# Patient Record
Sex: MALE | Race: WHITE | Employment: FULL TIME | URBAN - METROPOLITAN AREA
[De-identification: names, ages, dates, MRNs, and addresses within clinical notes are randomized per-mention and may not be internally consistent; named-entity substitution may affect disease eponyms.]

---

## 2021-09-21 ENCOUNTER — OFFICE VISIT (OUTPATIENT)
Dept: ORTHOPEDIC SURGERY | Age: 36
End: 2021-09-21
Payer: OTHER GOVERNMENT

## 2021-09-21 VITALS — HEART RATE: 88 BPM | WEIGHT: 271 LBS | TEMPERATURE: 98.7 F | RESPIRATION RATE: 14 BRPM | OXYGEN SATURATION: 98 %

## 2021-09-21 DIAGNOSIS — M25.531 RIGHT WRIST PAIN: ICD-10-CM

## 2021-09-21 DIAGNOSIS — M25.531 RIGHT WRIST PAIN: Primary | ICD-10-CM

## 2021-09-21 DIAGNOSIS — M25.331 SCAPHOLUNATE DISSOCIATION OF RIGHT WRIST: ICD-10-CM

## 2021-09-21 DIAGNOSIS — S69.81XA TFCC (TRIANGULAR FIBROCARTILAGE COMPLEX) INJURY, RIGHT, INITIAL ENCOUNTER: ICD-10-CM

## 2021-09-21 DIAGNOSIS — Z91.89 AT RISK FOR ANXIETY: ICD-10-CM

## 2021-09-21 PROCEDURE — 99203 OFFICE O/P NEW LOW 30 MIN: CPT | Performed by: PHYSICIAN ASSISTANT

## 2021-09-21 PROCEDURE — 73110 X-RAY EXAM OF WRIST: CPT | Performed by: PHYSICIAN ASSISTANT

## 2021-09-21 RX ORDER — DIAZEPAM 5 MG/1
5 TABLET ORAL ONCE
Qty: 1 TABLET | Refills: 0 | Status: SHIPPED | OUTPATIENT
Start: 2021-09-21 | End: 2021-09-21

## 2021-09-21 NOTE — PROGRESS NOTES
Patient: Thony Arnold                MRN: [id number]       SSN: [id number]  YOB: 1985        AGE: 39 y.o. SEX: male          PCP: David, Not On File, MD  09/21/21    Chief Complaint   Patient presents with    Wrist Pain     right wrist pain       HISTORY:  Thony Arnold is a 39 y.o. male who presents to the office with complaint of right wrist pain. He had an injury that occurred back in 2017 while a  with the [facility] states our services. No identifiable fracture could be found on x-ray post incident. Has been treated through the [employer] and recently had an MRI of the right wrist indicating a scapholunate injury. Patient was provided through the [facility] a custom thumb spica splint constructed by occupational therapy. He is wearing the splint near 24/7 and when the splint is removed he has severe pain at the base of the thumb side of the wrist.  He feels like the wrist is slipping out of place when he attempts to flex. 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  He had a previous test which was negative but had a secondary symptom possible allergic reaction and was placed in a work hold position by his employer until 2 - tests were returned. 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      Pain Assessment  9/21/2021   Location of Pain Wrist   Location Modifiers Right   Severity of Pain 7   Quality of Pain Sharp; Other (Comment)   Quality of Pain Comment siezing, cramping, \"random loss of feeling\"   Duration of Pain Persistent   Frequency of Pain Intermittent   Limiting Behavior Yes   Result of Injury Yes   Work-Related Injury Yes           No results found for: HBA1C, [id number], [id number]  Weight Metrics 9/21/2021   Weight 271 lb            Problem List Items Addressed This Visit     None      Visit Diagnoses     Right wrist pain    -  Primary    Relevant Orders    AMB POC XRAY, WRIST; COMPLETE, 3+ VIE (Completed)          PAST MEDICAL HISTORY: No past medical history on file. PAST SURGICAL HISTORY: No past surgical history on file. ALLERGIES: Not on File     CURRENT MEDICATIONS:  A list of medications prior to the time of admission include:  Prior to Admission medications    Not on File       FAMILY HISTORY: No family history on file. SOCIAL HISTORY:   Social History     Socioeconomic History    Marital status: UNKNOWN     Spouse name: Not on file    Number of children: Not on file    Years of education: Not on file    Highest education level: Not on file     Social Determinants of Health     Financial Resource Strain:     Difficulty of Paying Living Expenses:    Food Insecurity:     Worried About Running Out of Food in the Last Year:     [address] in the Last Year:    Transportation Needs:     Lack of Transportation (Medical):  Lack of Transportation (Non-Medical):    Physical Activity:     Days of Exercise per Week:     Minutes of Exercise per Session:    Stress:     Feeling of Stress :    Social Connections:     Frequency of Communication with Friends and Family:     Frequency of Social Gatherings with Friends and Family:     Attends Protestant Services:     Active Member of Clubs or Organizations:     Attends Club or Organization Meetings:     Marital Status:        ROS:No CP, No SOB, No fever/chills nor night sweats. No headaches, vision abnormalities to include double and oral loss of vision. No hearing abnormalities. Musculoskeletal pain per HPI. Pain is exacerbated positionally. Pt denies h/o spinal surgery, injections, or PT/chiropractor. Self treated with less than adequate relief on oral antiinflammatories. . Pt denies change in bowel or bladder habits. Pt denies fever, weight loss, or skin changes.         PHYSICAL EXAM:    Visit Vitals  Pulse 88   Temp 98.7 °F (37.1 °C) (Temporal)   Resp 14   Wt 271 lb (122.9 kg)   SpO2 98%       Constitutional: Appears well-developed and well-nourished. No distress. Sitting comfortably in the exam room, interacting with conversation with pleasant affect. Gait is steady and patient exhibits no evidence of ataxia. Patient is able to ambulate without difficulty. No focal neurological deficit noted. No facial droop, slurred speech, or evidence of altered mentation noted on exam.   Skin: Skin over the head, neck, bilateral limbs, and trunk is warm and dry. No rash or erythema noted. Cranial Nerves II-XII grossly intact  HENT: NC/AT. Normal symmetry, bulk and tone of facial and neck musculature. Trachea midline. No discernible thyromegaly or masses. No involuntary movements. Lymphatic: No preauricular, submandibuar, anterior or posterior cervical lymphadenopathy. Psychiatric: The patient is awake, alert, and oriented to person, place and time. Behavior is normal. Thought content normal.   Cardiovascular: No clubbing, cyanosis. No edema bilateral lower extremities. Pulmonary: No tripoding nor accessory muscle recruitment. Breathing normally, no distress, no audible wheezing. Distal cap refill intact at 2/2 Kleber UE / LE. Neuro intact Kleber UE/LE to noxious stimuli        Ortho Specific exam:    Right wrist reveals pain at the base of the thumb with a positive crank and a positive grind sign. Passive flexion limited today at only 20 degrees with pain throughout    Passive extension 10 degrees with pain throughout primarily on the radial side at the radiocarpal joint. Distal sensation intact fully right upper extremity. Patient has full flexion-extension of all digits of the right hand to include the thumb. Eversion barely 1 degree passively with pain and inversion trace with pain throughout. Pronation supination decreased by 75% secondary to pain reproduced over the scapholunate joint space.       X-ray: Phuong powell [address] 9/21/2021 space 3 view of the right wrist reflects what appears to be a healed distal radius fracture with subsequent radiocarpal osteoarthritis. There is widening of the scapholunate joint space. No soft tissue ossifications noted. MRI historical 6/18/2021: Degenerative and at least partial tear of the scapholunate ligament. Increased signal in the central TFCC region and trace distal radial ulnar joint effusion representing degenerative and possible central perforation. Degenerative change also noted in the radial scaphoid joint space. IMPRESSION:      ICD-10-CM ICD-9-CM    1. Right wrist pain  M25.531 719.43 AMB POC XRAY, WRIST; COMPLETE, 3+ VIE        PLAN: At this point with questionable results following the MRI date 6/18/2021 and confirmed with scapholunate widening I am ordering a MRI with contrast to fully assess the TFCC and ligamentous structures of the right wrist.  Patient will continue on light duty has no provided today. He will follow once the MRI is available with Dr. Bogdan Reagan. We will continue his custom thumb spica splint. X-rays reviewed copies provided all of his questions answered to his satisfaction. Additionally today we discussed the diagnosis of obesity and the importance of weight management for both cardiovascular health. The patient was recommended to decrease carbohydrate and sugar intake. Patient recommended a formal dietary consult which they will consider and return a call to our office. In light of the patient's osteoarthritic findings I am making a recommendation for aerobic exercise to include but not limited to stationary bicycle, elliptical, therapeutic walking with good shoes and or swimming. Patient should avoid any running or jumping. If using the treadmill then recommendation for no elevation and no running or jogging. Walking is improved. No Narcotic indicated today.  Patient given pain medication for short term acute pain relief. Goal is to treat patient according to above plan and to ultimately have patient off all pain medications once appropriate. If chronic pain management is required beyond what is expected for current orthopedic problem, will refer patient to pain management.  was reviewed and will be reviewed with every medication refill request.         Patient provided a reminder for a \"due or due soon\" health maintenance. I have asked the patient to schedule an appointment with their primary care provider for follow-up on general health maintenance concerns. Today all the patient's questions were answered to their satisfaction. Copies of x-rays reviewed if obtained this visit, and provided to patient. Dictation disclaimer:  Please note that this dictation was completed with Narrable, the computer voice recognition software. Quite often unanticipated grammatical, syntax, homophones, and other interpretive errors are inadvertently transcribed by the computer software. Please disregard these errors. Please excuse any errors that have escaped final proofreading. Venita VERAS, APC, MPAS, PA-C  [facility]

## 2021-09-21 NOTE — LETTER
9/21/2021 9:23 AM    Mr. Melisa Runner  [address]    To whom it may concern:    Patient seen and examined in the orthopedic office today. Patient cleared to return to light duty only 9/21/2021 with the following restrictions. 1.  No ladder or scaffolding climbing  2. No crawling  3. Push, pull, lift, carry no more than 1 pound right upper extremity. 4.  Patient must wear thumb spica custom splint right upper extremity 24/7 may remove for hygiene purposes.                 Sincerely,      Maria Guadalupe Gonzalez PA-C

## 2021-09-22 ENCOUNTER — TELEPHONE (OUTPATIENT)
Dept: ORTHOPEDIC SURGERY | Age: 36
End: 2021-09-22

## 2021-09-22 DIAGNOSIS — M25.539 PAIN IN WRIST, UNSPECIFIED LATERALITY: ICD-10-CM

## 2021-09-22 DIAGNOSIS — M25.331 SCAPHOLUNATE DISSOCIATION OF RIGHT WRIST: ICD-10-CM

## 2021-09-22 DIAGNOSIS — M25.539 PAIN IN WRIST, UNSPECIFIED LATERALITY: Primary | ICD-10-CM

## 2021-09-22 NOTE — TELEPHONE ENCOUNTER
I received email from [address] [facility] asking  \" Can you get someone to add pt Arthrogram order in\"    \"He also wants to know if his Ativan was put in and to call him to let him know\"    Farhana Mac saw patient and ordered MRI right wrist with contrast.     Please let me know when completed so I can advise TRIPP.

## 2021-10-08 ENCOUNTER — HOSPITAL ENCOUNTER (OUTPATIENT)
Dept: GENERAL RADIOLOGY | Age: 36
Discharge: HOME | End: 2021-10-08
Payer: OTHER GOVERNMENT

## 2021-10-08 ENCOUNTER — HOSPITAL ENCOUNTER (OUTPATIENT)
Dept: MRI IMAGING | Age: 36
Discharge: HOME | End: 2021-10-08
Payer: OTHER GOVERNMENT

## 2021-10-08 PROCEDURE — 74011250636 HC RX REV CODE- 250/636: Performed by: PHYSICIAN ASSISTANT

## 2021-10-08 PROCEDURE — 74011000636 HC RX REV CODE- 636: Performed by: PHYSICIAN ASSISTANT

## 2021-10-08 PROCEDURE — 74011000250 HC RX REV CODE- 250: Performed by: PHYSICIAN ASSISTANT

## 2021-10-08 PROCEDURE — 73222 MRI JOINT UPR EXTREM W/DYE: CPT

## 2021-10-08 PROCEDURE — A9576 INJ PROHANCE MULTIPACK: HCPCS | Performed by: PHYSICIAN ASSISTANT

## 2021-10-08 PROCEDURE — 77002 NEEDLE LOCALIZATION BY XRAY: CPT

## 2021-10-08 RX ORDER — LIDOCAINE HYDROCHLORIDE 10 MG/ML
1-5 INJECTION INFILTRATION; PERINEURAL ONCE
Status: DISCONTINUED | OUTPATIENT
Start: 2021-10-08 | End: 2021-10-08

## 2021-10-08 RX ORDER — LIDOCAINE HYDROCHLORIDE 10 MG/ML
4 INJECTION INFILTRATION; PERINEURAL ONCE
Status: DISCONTINUED | OUTPATIENT
Start: 2021-10-08 | End: 2021-10-08 | Stop reason: CLARIF

## 2021-10-08 RX ORDER — LIDOCAINE HYDROCHLORIDE 10 MG/ML
5 INJECTION, SOLUTION EPIDURAL; INFILTRATION; INTRACAUDAL; PERINEURAL ONCE
Status: COMPLETED | OUTPATIENT
Start: 2021-10-08 | End: 2021-10-08

## 2021-10-08 RX ADMIN — GADOTERIDOL 0.1 ML: 279.3 INJECTION, SOLUTION INTRAVENOUS at 11:00

## 2021-10-08 RX ADMIN — IOPAMIDOL 1 ML: 612 INJECTION, SOLUTION INTRATHECAL at 11:00

## 2021-10-08 RX ADMIN — LIDOCAINE HYDROCHLORIDE ANHYDROUS 2 ML: 10 INJECTION, SOLUTION INFILTRATION at 11:00

## 2021-11-03 ENCOUNTER — OFFICE VISIT (OUTPATIENT)
Dept: ORTHOPEDIC SURGERY | Age: 36
End: 2021-11-03
Payer: OTHER GOVERNMENT

## 2021-11-03 VITALS
BODY MASS INDEX: 39.97 KG/M2 | RESPIRATION RATE: 16 BRPM | OXYGEN SATURATION: 99 % | TEMPERATURE: 97.2 F | HEIGHT: 70 IN | HEART RATE: 85 BPM | WEIGHT: 279.2 LBS

## 2021-11-03 DIAGNOSIS — S69.82XS TFCC (TRIANGULAR FIBROCARTILAGE COMPLEX) INJURY, LEFT, SEQUELA: ICD-10-CM

## 2021-11-03 DIAGNOSIS — M25.331 CARPAL INSTABILITY OF RIGHT WRIST WITH DORSAL INTERCALATED SEGMENT INSTABILITY: Primary | ICD-10-CM

## 2021-11-03 PROCEDURE — 99214 OFFICE O/P EST MOD 30 MIN: CPT | Performed by: ORTHOPAEDIC SURGERY

## 2021-11-03 PROCEDURE — 20605 DRAIN/INJ JOINT/BURSA W/O US: CPT | Performed by: ORTHOPAEDIC SURGERY

## 2021-11-03 RX ORDER — AMLODIPINE BESYLATE 5 MG/1
1 TABLET ORAL DAILY
COMMUNITY
Start: 2020-10-19

## 2021-11-03 RX ORDER — TOPIRAMATE 200 MG/1
1 TABLET ORAL DAILY
COMMUNITY
Start: 2021-07-20

## 2021-11-03 RX ORDER — PRAZOSIN HYDROCHLORIDE 2 MG/1
CAPSULE ORAL
COMMUNITY
Start: 2021-07-20

## 2021-11-03 RX ORDER — GABAPENTIN 300 MG/1
CAPSULE ORAL
COMMUNITY
Start: 2021-07-20

## 2021-11-03 RX ORDER — HYDROXYZINE PAMOATE 25 MG/1
CAPSULE ORAL
COMMUNITY
Start: 2021-07-20

## 2021-11-03 NOTE — PROGRESS NOTES
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 Vitals:    11/03/21 0814   Pulse: 85   Resp: 16   Temp: 97.2 °F (36.2 °C)   TempSrc: Temporal   SpO2: 99%   Weight: 279 lb 3.2 oz (126.6 kg)   Height: 5' 10\" (1.778 m)   PainSc:   6   PainLoc: Wrist           Chief Complaint   Patient presents with    Wrist Pain     right wrist pain         HPI: Patient presents today with a history of right wrist injury and subsequent pain for the past 4 years. He has been in a splint. Date of onset:  2017    Injury: Yes: Comment: Fall    Prior Treatment:  Yes: Comment: Splint and therapy    Numbness/ Tingling: No      ROS: Review of Systems - General ROS: negative  Psychological ROS: negative  ENT ROS: negative  Allergy and Immunology ROS: negative  Hematological and Lymphatic ROS: negative  Respiratory ROS: no cough, shortness of breath, or wheezing  Cardiovascular ROS: no chest pain or dyspnea on exertion  Gastrointestinal ROS: no abdominal pain, change in bowel habits, or black or bloody stools  Musculoskeletal ROS: negative  Neurological ROS: negative  Dermatological ROS: negative    Past Medical History:   Diagnosis Date    Hypertension        History reviewed. No pertinent surgical history. Current Outpatient Medications   Medication Sig Dispense Refill    amLODIPine (NORVASC) 5 mg tablet Take 1 Tablet by mouth daily.  gabapentin (NEURONTIN) 300 mg capsule TAKE TWO CAPSULES BY MOUTH AT BEDTIME AND TAKE THREE CAPSULES EVERY MORNING      topiramate (TOPAMAX) 200 mg tablet Take 1 Tablet by mouth daily.       prazosin (MINIPRESS) 2 mg capsule TAKE TWO CAPSULES BY MOUTH AT BEDTIME      hydrOXYzine pamoate (VISTARIL) 25 mg capsule TAKE ONE CAPSULE BY MOUTH FOUR TIMES A DAY AS NEEDED FOR ANXIETY       Current Facility-Administered Medications   Medication Dose Route Frequency Provider Last Rate Last Admin    triamcinolone acetonide (KENALOG) 10 mg/mL injection 5 mg  5 mg Other ONCE Rad Ceja, DO           Allergies   Allergen Reactions    Hydrogen Peroxide Hives    Meloxicam Unknown (comments)    Metoprolol Unknown (comments)    Quetiapine Unknown (comments)           PE:     Physical Exam  Vitals and nursing note reviewed. Constitutional:       General: He is not in acute distress. Appearance: Normal appearance. He is not ill-appearing. Cardiovascular:      Pulses: Normal pulses. Pulmonary:      Effort: Pulmonary effort is normal. No respiratory distress. Musculoskeletal:         General: Tenderness present. No swelling, deformity or signs of injury. Cervical back: Normal range of motion. Right lower leg: No edema. Left lower leg: No edema. Skin:     General: Skin is warm and dry. Findings: No bruising or erythema. Neurological:      General: No focal deficit present. Mental Status: He is alert and oriented to person, place, and time. Psychiatric:         Mood and Affect: Mood normal.         Behavior: Behavior normal.            Wrist: There is tenderness to palpation about the dorsum of the right wrist especially with the scapholunate ligament as well as some along the ulnar side of the wrist.    Tenderness L R Test L R   1st Ext Comp - - Finkelstein's - -   Snuff Box - - Goodman - +   2nd Ext Comp - - S-L Shear - -   S-L Joint - - L-T Shear - -   L-T Joint - - DRUJ Sup - -   6th Ext Comp - - DRUJ Pro - -   Ulnar Snuff - - DRUJ Grind - -   Fovea - - TFCC - -   STT Joint - - Mid-Carp Inst - -   FCR - - P-T Grind - -   Intersection - - ECU Sublux. - -      Dorsal Ganglion: -   Volar Ganglion: -      ROM: Full        Imaging:     10/8/2021 right wrist MRI with contrast     IMPRESSION  1.  Imaging findings in keeping with prior sprain of the scapholunate ligament  complex with accompanying diastases of the scapholunate interval as well as a  dorsal tilt to the lunate as can be be seen with dorsal intercalated segmental  instability. 2. Small subtle area of perforation involving the central articular disc of the  TFC, transmitting a small quantity of injected contrast into the distal radial  ulnar joint. 3. Mild changes of chondrosis across the radiocarpal articulation. Previous plain films of right wrist significant for scapholunate diastases. Additionally there is evidence of intercalated segment instability. ICD-10-CM ICD-9-CM    1. 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 TFCC (triangular fibrocartilage complex) injury, left, sequela  S69.82XS 908.9          Plan:     I had a long discussion with the patient regarding treatment options including operative versus nonoperative and the different type of operative treatment options. At this point he would like to proceed with nonoperative treatment and see how an injection would work considering he has not had this yet. Follow-up and Dispositions    · Return if symptoms worsen or fail to improve.           Plan was reviewed with patient, who verbalized agreement and understanding of the plan    [address] [facility] NOTE        Chart reviewed for the following:   IRad DO, have reviewed the History, Physical and updated the Allergic reactions for Harika Loyola performed immediately prior to start of procedure:   Erickson ZIEGLER DO, have performed the following reviews on [facility] prior to the start of the procedure:            * Patient was identified by name and date of birth   * Agreement on procedure being performed was verified  * Risks and Benefits explained to the patient  * Procedure site verified and marked as necessary  * Patient was positioned for comfort  * Consent was signed and verified     Time: 08:58 AM      Date of procedure: 11/3/2021    Procedure performed by: Kirby Boykin DO    Provider assisted by: Dinora Mays MA    Patient assisted by: self    How tolerated by patient: tolerated the procedure well with no complications    Post Procedural Pain Scale: 0 - No Hurt    Comments: none    Procedure:  After consent was obtained, using sterile technique the right wrist was prepped. Local anesthetic used: 1% lidocaine. Kenalog 5 mg and was then injected and the needle withdrawn. The procedure was well tolerated. The patient is asked to continue to rest the area for a few more days before resuming regular activities. It may be more painful for the first 1-2 days. Watch for fever, or increased swelling or persistent pain in the joint. Call or return to clinic prn if such symptoms occur or there is failure to improve as anticipated.

## 2022-02-16 ENCOUNTER — OFFICE VISIT (OUTPATIENT)
Dept: ORTHOPEDIC SURGERY | Age: 37
End: 2022-02-16
Payer: OTHER GOVERNMENT

## 2022-02-16 VITALS — WEIGHT: 279 LBS | HEART RATE: 110 BPM | OXYGEN SATURATION: 98 % | HEIGHT: 70 IN | BODY MASS INDEX: 39.94 KG/M2

## 2022-02-16 DIAGNOSIS — M65.311 TRIGGER THUMB OF RIGHT HAND: ICD-10-CM

## 2022-02-16 DIAGNOSIS — M25.331 CARPAL INSTABILITY OF RIGHT WRIST WITH DORSAL INTERCALATED SEGMENT INSTABILITY: Primary | ICD-10-CM

## 2022-02-16 PROCEDURE — 99214 OFFICE O/P EST MOD 30 MIN: CPT | Performed by: ORTHOPAEDIC SURGERY

## 2022-02-16 PROCEDURE — 20605 DRAIN/INJ JOINT/BURSA W/O US: CPT | Performed by: ORTHOPAEDIC SURGERY

## 2022-02-16 PROCEDURE — 20550 NJX 1 TENDON SHEATH/LIGAMENT: CPT | Performed by: ORTHOPAEDIC SURGERY

## 2022-02-16 NOTE — PROGRESS NOTES
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 Vitals:    02/16/22 0937   Pulse: (!) 110   SpO2: 98%   Weight: 279 lb (126.6 kg)   Height: 5' 10\" (1.778 m)   PainSc:   9   PainLoc: Wrist           Chief Complaint   Patient presents with    Wrist Pain     right wrist       HPI: Patient presents today with a new complaint of right thumb pain and locking as well as worsening pain in his wrist.  He previously received wrist joint injection that did help significantly until recently. He would also like to return to work and have his duties increased. Initial HPI: Patient presents today with a history of right wrist injury and subsequent pain for the past 4 years. He has been in a splint. Date of onset:  2017    Injury: Yes: Comment: Fall    Prior Treatment:  Yes: Comment: Right wrist joint injection. Numbness/ Tingling: No      ROS: Review of Systems - General ROS: negative  Psychological ROS: negative  ENT ROS: negative  Allergy and Immunology ROS: negative  Hematological and Lymphatic ROS: negative  Respiratory ROS: no cough, shortness of breath, or wheezing  Cardiovascular ROS: no chest pain or dyspnea on exertion  Gastrointestinal ROS: no abdominal pain, change in bowel habits, or black or bloody stools  Musculoskeletal ROS: negative  Neurological ROS: negative  Dermatological ROS: negative    Past Medical History:   Diagnosis Date    Hypertension        History reviewed. No pertinent surgical history. Current Outpatient Medications   Medication Sig Dispense Refill    amLODIPine (NORVASC) 5 mg tablet Take 1 Tablet by mouth daily.  gabapentin (NEURONTIN) 300 mg capsule TAKE TWO CAPSULES BY MOUTH AT BEDTIME AND TAKE THREE CAPSULES EVERY MORNING      topiramate (TOPAMAX) 200 mg tablet Take 1 Tablet by mouth daily.       prazosin (MINIPRESS) 2 mg capsule TAKE TWO CAPSULES BY MOUTH AT BEDTIME      hydrOXYzine pamoate (VISTARIL) 25 mg capsule TAKE ONE CAPSULE BY MOUTH FOUR TIMES A DAY AS NEEDED FOR ANXIETY       Current Facility-Administered Medications   Medication Dose Route Frequency Provider Last Rate Last Admin    triamcinolone acetonide (KENALOG) 10 mg/mL injection 10 mg  10 mg Other ONCE Rad Ceja, DO           Allergies   Allergen Reactions    Hydrogen Peroxide Hives    Meloxicam Unknown (comments)    Metoprolol Unknown (comments)    Quetiapine Unknown (comments)           PE:     Physical Exam  Vitals and nursing note reviewed. Constitutional:       General: He is not in acute distress. Appearance: Normal appearance. He is not ill-appearing. Cardiovascular:      Pulses: Normal pulses. Pulmonary:      Effort: Pulmonary effort is normal. No respiratory distress. Musculoskeletal:         General: Tenderness present. No swelling, deformity or signs of injury. Cervical back: Normal range of motion. Right lower leg: No edema. Left lower leg: No edema. Skin:     General: Skin is warm and dry. Findings: No bruising or erythema. Neurological:      General: No focal deficit present. Mental Status: He is alert and oriented to person, place, and time.    Psychiatric:         Mood and Affect: Mood normal.         Behavior: Behavior normal.          Hand:    Examination L Digit(s) R Digit(s)   1st CMC Tenderness -  -    1st CMC Grind -  -    Cullen Nodes -  -    Heberden Nodes -  -    A1 Pulley Tenderness -  + Th   Triggering -  + Th   UCL Instability -  -    RCL Instability -  -    Lateral Stress Pain -  -    Palmar Cords -  -    Tabletop test -  -    Garrod's Pads -  -     Strength       Pinch Strength         ROM: Full        Wrist: Again there is tenderness dorsally over the wrist.    Tenderness L R Test L R   1st Ext Comp - - Finkelstein's - -   Snuff Box - - Goodman - +   2nd Ext Comp - - S-L Shear - -   S-L Joint - - L-T Shear - -   L-T Joint - - DRUJ Sup - -   6th Ext Comp - - DRUJ Pro - -   Ulnar Snuff - - DRUJ Grind - -   Fovea - - TFCC - -   STT Joint - - Mid-Carp Inst - -   FCR - - P-T Grind - -   Intersection - - ECU Sublux. - -      Dorsal Ganglion: -   Volar Ganglion: -      ROM: Full        Imaging:     10/8/2021 right wrist MRI with contrast     IMPRESSION  1. Imaging findings in keeping with prior sprain of the scapholunate ligament  complex with accompanying diastases of the scapholunate interval as well as a  dorsal tilt to the lunate as can be be seen with dorsal intercalated segmental  instability. 2. Small subtle area of perforation involving the central articular disc of the  TFC, transmitting a small quantity of injected contrast into the distal radial  ulnar joint. 3. Mild changes of chondrosis across the radiocarpal articulation. Previous plain films of right wrist significant for scapholunate diastases. Additionally there is evidence of intercalated segment instability. ICD-10-CM ICD-9-CM    1. 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 TFCC (triangular fibrocartilage complex) injury, left, sequela  S69.82XS 908.9 AMB SUPPLY ORDER   3. Trigger thumb of right hand  M65.311 727.03 INJECT TENDON SHEATH/LIGAMENT      triamcinolone acetonide (KENALOG) 10 mg/mL injection 10 mg         Plan:     Had a discussion with the patient regarding possibility of surgery in the future and the likely long recovery process. Right thumb A1 pulley injection and right wrist joint injection. New brace given to patient today. Follow-up and Dispositions    · Return if symptoms worsen or fail to improve.           Plan was reviewed with patient, who verbalized agreement and understanding of the plan    [address] [facility] NOTE        Chart reviewed for the following:   Rad ZIEGLER DO, have reviewed the History, Physical and updated the Allergic reactions for Eljose roberto Fast     TIME OUT performed immediately prior to start of procedure:   I, Gwenetta Rinne, DO, have performed the following reviews on Harika Hernandez Borne prior to the start of the procedure:            * Patient was identified by name and date of birth   * Agreement on procedure being performed was verified  * Risks and Benefits explained to the patient  * Procedure site verified and marked as necessary  * Patient was positioned for comfort  * Consent was signed and verified     Time: 10:24 AM      Date of procedure: 2/16/2022    Procedure performed by: Gwenetta Rinne, DO    Provider assisted by: Sil Santoro MA    Patient assisted by: self    How tolerated by patient: tolerated the procedure well with no complications    Post Procedural Pain Scale: 0 - No Hurt    Comments: none    Procedure:  After consent was obtained, using sterile technique the right wrist and right thumb was prepped. Local anesthetic used: 1% lidocaine. Kenalog 5 mg X2 and was then injected and the needle withdrawn. The procedure was well tolerated. The patient is asked to continue to rest the area for a few more days before resuming regular activities. It may be more painful for the first 1-2 days. Watch for fever, or increased swelling or persistent pain in the joint. Call or return to clinic prn if such symptoms occur or there is failure to improve as anticipated.

## 2022-02-16 NOTE — LETTER
NOTIFICATION RETURN TO WORK    2/16/2022 10:10 AM    Mr. Dawood Miller  [address]      To Whom It May Concern:    Dawood Miller is currently under the care of [address] [place] [address]. He will return to work with a 25 pound lifting restriction. Patient is able to perform CPR. If there are questions or concerns please have the patient contact our office.         Sincerely,      Smitha Ruiz, DO

## 2022-03-28 ENCOUNTER — TELEPHONE (OUTPATIENT)
Dept: ORTHOPEDIC SURGERY | Age: 37
End: 2022-03-28

## 2022-03-28 NOTE — TELEPHONE ENCOUNTER
Appointment cancelled as he is moving this week. Patient would like a copy of his x-ray on a disc taken in office. Patient would like a call when the disc is ready, as he is moving on Wednesday or Thursday of this week. Patient expressed gratitude for the care he has received from us.

## 2022-03-28 NOTE — TELEPHONE ENCOUNTER
Patient states he is moving and in the process of transferring information. Would like to know if he should still keep appt on 5/18.

## 2022-03-28 NOTE — TELEPHONE ENCOUNTER
This is up to him. If he will still be in town, I am happy to evaluate him and offer any treatment that we can before he leaves or he can set up an appointment with a new hand surgeon where he is moving to.